# Patient Record
Sex: MALE | Race: WHITE | URBAN - METROPOLITAN AREA
[De-identification: names, ages, dates, MRNs, and addresses within clinical notes are randomized per-mention and may not be internally consistent; named-entity substitution may affect disease eponyms.]

---

## 2022-07-25 ASSESSMENT — EJECTION FRACTION: EF_VALUE: 69

## 2022-07-25 ASSESSMENT — EXERCISE STRESS TEST
PEAK_BP: 122/72
PEAK_HR: 113

## 2022-07-26 ENCOUNTER — HOSPITAL ENCOUNTER (OUTPATIENT)
Dept: CARDIAC REHAB | Age: 42
Setting detail: THERAPIES SERIES
Discharge: HOME | End: 2022-07-26
Payer: COMMERCIAL

## 2022-07-26 VITALS
HEART RATE: 87 BPM | SYSTOLIC BLOOD PRESSURE: 116 MMHG | WEIGHT: 238 LBS | OXYGEN SATURATION: 98 % | RESPIRATION RATE: 18 BRPM | BODY MASS INDEX: 34.07 KG/M2 | DIASTOLIC BLOOD PRESSURE: 68 MMHG | HEIGHT: 70 IN

## 2022-07-26 RX ORDER — PANTOPRAZOLE SODIUM 40 MG/1
40 TABLET, DELAYED RELEASE ORAL 2 TIMES DAILY
COMMUNITY
Start: 2022-06-28

## 2022-07-26 RX ORDER — ACETAMINOPHEN 500 MG
1000 TABLET ORAL EVERY 6 HOURS PRN
COMMUNITY
Start: 2022-06-03

## 2022-07-26 RX ORDER — WARFARIN SODIUM 4 MG/1
4 TABLET ORAL DAILY
COMMUNITY
Start: 2022-07-26

## 2022-07-26 RX ORDER — FLUTICASONE PROPIONATE 50 MCG
2 SPRAY, SUSPENSION (ML) NASAL DAILY PRN
COMMUNITY
Start: 2021-12-21

## 2022-07-26 RX ORDER — PREDNISONE 10 MG/1
20 TABLET ORAL DAILY
COMMUNITY
Start: 2022-07-18

## 2022-07-26 RX ORDER — ASPIRIN 81 MG/1
81 TABLET ORAL DAILY
COMMUNITY
Start: 2022-05-12

## 2022-07-26 RX ORDER — ATORVASTATIN CALCIUM 10 MG/1
10 TABLET, FILM COATED ORAL NIGHTLY
COMMUNITY
Start: 2022-06-30 | End: 2023-06-25

## 2022-07-26 RX ORDER — ESCITALOPRAM OXALATE 5 MG/1
5 TABLET ORAL DAILY
COMMUNITY
Start: 2022-06-01

## 2022-07-26 ASSESSMENT — PATIENT HEALTH QUESTIONNAIRE - PHQ9
SUM OF ALL RESPONSES TO PHQ9 QUESTIONS 1 & 2: 1
8. MOVING OR SPEAKING SO SLOWLY THAT OTHER PEOPLE COULD HAVE NOTICED. OR THE OPPOSITE, BEING SO FIGETY OR RESTLESS THAT YOU HAVE BEEN MOVING AROUND A LOT MORE THAN USUAL: 0
SUM OF ALL RESPONSES TO PHQ QUESTIONS 1-9: 4
SUM OF ALL RESPONSES TO PHQ QUESTIONS 1-9: 4
5. POOR APPETITE OR OVEREATING: 1
6. FEELING BAD ABOUT YOURSELF - OR THAT YOU ARE A FAILURE OR HAVE LET YOURSELF OR YOUR FAMILY DOWN: 0
7. TROUBLE CONCENTRATING ON THINGS, SUCH AS READING THE NEWSPAPER OR WATCHING TELEVISION: 0
1. LITTLE INTEREST OR PLEASURE IN DOING THINGS: 0
SUM OF ALL RESPONSES TO PHQ QUESTIONS 1-9: 4
SUM OF ALL RESPONSES TO PHQ QUESTIONS 1-9: 4
9. THOUGHTS THAT YOU WOULD BE BETTER OFF DEAD, OR OF HURTING YOURSELF: 0
2. FEELING DOWN, DEPRESSED OR HOPELESS: 1
4. FEELING TIRED OR HAVING LITTLE ENERGY: 2
3. TROUBLE FALLING OR STAYING ASLEEP: 0

## 2022-07-26 NOTE — PLAN OF CARE
Individual Treatment Plan  Cardiac Rehabilitation    Initial Assessment  Name: Estefanía Madrigal Admit to Rehab: 2022   : 1980 Primary Diagnosis: CABG    Age: 38 y.o. Referring Physician:  Bina Garza   MRN: [id number] Primary Care Physician: Radha VELASQUEZ   No Known Allergies       Date: 2022    Exercise Assessment  Stages of Change: Action   Risk Stratification: Medium    Fall Risk:   Assistive Devices:None    Initial Assessment:   6 Minute Walk Test   Pre-Test Vitals: Data Measured Before Walk  Heart Rate: 91  Blood Pressure: 108/74  O2 Saturation: 98  O2 Device: Room air;       Peak Vitals: Data Measured Immediately After Walk  Distance Walked (ft): 1150 ft  Peak Heart Rate: 113  Peak Blood Pressure: 122/72  RPE: 13  O2 Saturation: 98  Met: 2.1       During: Data Measured during Walk  Symptoms: Tightness (tightness around lower chest and mid back.)   Number of Rest: 0    Post-Test Vitals: Data Measured at 5 Minutes After Walk  Heart Rate: 84  Blood Pressure: 118/74  O2 Device: Room air  Symptoms: Patient reports chest tightness with walking. Patient has had this pain since he started walking in the hospital.       Antihypertensives:yes - Metoprolol    Exercise Prescription        Mode: . Treadmill, Bike (Upright or recumbent), NuStep, Rower, SciFit, Airdyne, Arm Ergometer, Recumbent Eliptical, and Walking      Frequency: 2-3 days/week supervised      Intensity:       RPE zone 11-14      Target Heart Rate zone: Resting HR + 20-30      Duration: 30+ minutes/day      Resistance Training: Yes, 3 days per week      Progression: Increase exercise by 0.5 METs every 1 to 2 weeks to goal of 4 to 6 METs      Supplemental oxygen: is not on home oxygen therapy. Plan  Home Exercise:Yes Patient employer has a gym he can use.   Mode:Treadmill, Bike (Upright or recumbent), Elliptical, and Walking  Resistance Training: yes     Target Goals  Achieve exercise to 3-4.9 METs   Education   Ian Chase was educated on the importance of self pulse check, warm up and cool down, signs and symptoms to report, exercise safety, RPE scale, Braxton Scale of Perceived Exertion use, Importance of physical activity and exercise progression, equipment orientation, home exercise program, low sodium diet, blood pressure education, and blood pressure medication Patient looking at purchasing a smart watch to track heart rate/oxygen. Nutrition Assessment  Stages of Change: Contemplation    Weight: 238 lb (108 kg) BMI (Calculated): 0     Nutrition Survey: Rate Your Plate Score: Rate Your Plate Total Score: 35     5-10% Weight Loss:    Patient Weight Goal: 230   Recommended Diet: low fat, low cholesterol, substitute healthy fats, such as olive oil, canola oil, grapeseed oil for saturated fats like butter, margarine, and shortening, minimize processed foods, reduce sodium intake, minimize simple sugars, and increase fiber intake  Diabetic:  No  Diabetic Medications: no  A1C:  No results found for: LABA1C 5.2         Fasting Blood Glucose:  [unfilled]  No results found for: CHOL 215 No results found for: HDL 41   Lab Results   Component Value Date    GLUCOSE 107 (H) 06/02/2014    No results found for: TRIG 41   Alcohol Use:ALCHXP@     Nutrition Intervention  Attend education classes related to nutrition and Complete diet survey    Target Goals  Articulate heart healthy diet , Complete cardiac diet classes , Control blood pressure , Control cholesterol values , and Reduce weight   Patient Goal: Lower sodium intake and stop drinking soda and eating desserts. Education  Mr. Tomy Mcpherson was educated on the importance of maintaining optimal weight/BMI.     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  Estimated return to work date: 08/18/2022    Psychosocial Test  Tool Used: PHQ-2/9 Today's PHQ:    PHQ Scores 7/26/2022   PHQ2 Score 1   PHQ9 Score 4     Interpretation of Total Score Depression Severity: 1-4 = Minimal depression, 5-9 = Mild depression, 10-14 = Moderate depression, 15-19 = Moderately severe depression, 20-27 = Severe depression    Reports psychosocial symptoms: yes  Currently on medication therapy: yes  Currently seeing a mental health professional: no  Positive support system: yes    Psychosocial Intervention  Attend education classes related to stress management and relaxation and Learn and utilize stress management and coping skills    Target Goals  Return to ADLs/desired activities , Maximize stress management/coping skills , and Improve quality of live, self-efficacy and depression screening scores as measured by the appropriate tool     Education Assessment  Stages of Change: Action  Barriers to Learning: No barriers  Personal Learning Style: Demonstration, Verbal, Video, and Written  Social History     Tobacco Use   Smoking Status Never   Smokeless Tobacco Former    Types: Snuff    Quit date: 10/4/2021       Education Intervention/Learning Needs Identified  Exercise, Risk factor for CAD, Stress management and relaxation, and Weight management    Target Goals  Articulate understanding of self-management and prevention/treatment of cardiac disease , Attend appropriate group education classes , and Restore to highest level of independent functionality     Education  Mr. Kimberlee Moses was educated on the importance of healthy coping techniques and stress management and relaxation techniques                           Provider Review and Approval of this ITP    The above treatment plan and goals have been set for your patient during Cardiac Rehabilitation. Please review and Electronically Cosign/ or Sign (if Fax Provider)            *Please comment and make changes to the EX RX or treatment plan if needed*                       Electronic Provider comment:              Please indicate with Cosign Comment.                    Electronically signed by Mattie Dan RN on 7/26/22 at 9:59 AM EDT

## 2022-07-26 NOTE — PROGRESS NOTES
Initial ITP signed by medical director. Called pt to schedule 1st day of exercise and he can start 07/27/22 at 10:30 am class.  Instructed pt to be here at 10:15 am.

## 2022-07-27 ENCOUNTER — HOSPITAL ENCOUNTER (OUTPATIENT)
Dept: CARDIAC REHAB | Age: 42
Setting detail: THERAPIES SERIES
Discharge: HOME | End: 2022-07-27
Payer: COMMERCIAL

## 2022-07-27 PROCEDURE — 93798 PHYS/QHP OP CAR RHAB W/ECG: CPT

## 2022-07-29 ENCOUNTER — HOSPITAL ENCOUNTER (OUTPATIENT)
Dept: CARDIAC REHAB | Age: 42
Setting detail: THERAPIES SERIES
Discharge: HOME | End: 2022-07-29
Payer: COMMERCIAL

## 2022-07-29 PROCEDURE — 93798 PHYS/QHP OP CAR RHAB W/ECG: CPT

## 2022-08-01 ENCOUNTER — HOSPITAL ENCOUNTER (OUTPATIENT)
Dept: CARDIAC REHAB | Age: 42
Setting detail: THERAPIES SERIES
Discharge: HOME | End: 2022-08-01
Payer: COMMERCIAL

## 2022-08-01 PROCEDURE — 93798 PHYS/QHP OP CAR RHAB W/ECG: CPT

## 2022-08-05 ENCOUNTER — HOSPITAL ENCOUNTER (OUTPATIENT)
Dept: CARDIAC REHAB | Age: 42
Setting detail: THERAPIES SERIES
Discharge: HOME | End: 2022-08-05
Payer: COMMERCIAL

## 2022-08-05 PROCEDURE — 93798 PHYS/QHP OP CAR RHAB W/ECG: CPT

## 2022-08-12 ENCOUNTER — HOSPITAL ENCOUNTER (OUTPATIENT)
Dept: CARDIAC REHAB | Age: 42
Setting detail: THERAPIES SERIES
Discharge: HOME | End: 2022-08-12
Payer: COMMERCIAL

## 2022-08-12 PROCEDURE — 93798 PHYS/QHP OP CAR RHAB W/ECG: CPT

## 2022-08-15 ENCOUNTER — HOSPITAL ENCOUNTER (OUTPATIENT)
Dept: CARDIAC REHAB | Age: 42
Setting detail: THERAPIES SERIES
Discharge: HOME | End: 2022-08-15
Payer: COMMERCIAL

## 2022-08-15 PROCEDURE — 93798 PHYS/QHP OP CAR RHAB W/ECG: CPT

## 2022-08-17 ENCOUNTER — HOSPITAL ENCOUNTER (OUTPATIENT)
Dept: CARDIAC REHAB | Age: 42
Setting detail: THERAPIES SERIES
Discharge: HOME | End: 2022-08-17
Payer: COMMERCIAL

## 2022-08-17 PROCEDURE — 93798 PHYS/QHP OP CAR RHAB W/ECG: CPT

## 2022-08-19 ENCOUNTER — HOSPITAL ENCOUNTER (OUTPATIENT)
Dept: CARDIAC REHAB | Age: 42
Setting detail: THERAPIES SERIES
Discharge: HOME | End: 2022-08-19
Payer: COMMERCIAL

## 2022-08-19 PROCEDURE — 93798 PHYS/QHP OP CAR RHAB W/ECG: CPT

## 2022-08-22 ENCOUNTER — HOSPITAL ENCOUNTER (OUTPATIENT)
Dept: CARDIAC REHAB | Age: 42
Setting detail: THERAPIES SERIES
Discharge: HOME | End: 2022-08-22
Payer: COMMERCIAL

## 2022-08-22 ENCOUNTER — HOSPITAL ENCOUNTER (OUTPATIENT)
Dept: CARDIAC REHAB | Age: 42
Setting detail: THERAPIES SERIES
End: 2022-08-22
Payer: COMMERCIAL

## 2022-08-22 PROCEDURE — 93798 PHYS/QHP OP CAR RHAB W/ECG: CPT

## 2022-08-24 ENCOUNTER — TELEPHONE (OUTPATIENT)
Dept: CARDIAC REHAB | Age: 42
End: 2022-08-24

## 2022-08-24 ENCOUNTER — HOSPITAL ENCOUNTER (OUTPATIENT)
Dept: CARDIAC REHAB | Age: 42
Setting detail: THERAPIES SERIES
Discharge: HOME | End: 2022-08-24
Payer: COMMERCIAL

## 2022-08-24 ENCOUNTER — APPOINTMENT (OUTPATIENT)
Dept: CARDIAC REHAB | Age: 42
End: 2022-08-24
Payer: COMMERCIAL

## 2022-08-24 ASSESSMENT — EJECTION FRACTION: EF_VALUE: 69

## 2022-08-24 NOTE — TELEPHONE ENCOUNTER
Patient will not be able to make it to his Cardiac Rehab appointment today, 8/24/2022 due to not getting out of work on time.

## 2022-08-25 NOTE — PLAN OF CARE
Individual Treatment Plan  Cardiac Rehabilitation    Reassessment  Name: Edmar Bosch Reassessment: 30 days   : 1980 Primary Diagnosis: CABG    Age: 38 y.o. Referring Physician:  Irina Oliva    MRN: [id number] Primary Care Physician: Rosi VELASQUEZ   No Known Allergies    Date: 2022    Exercise Assessment  Stages of Change: Action   Risk Stratification: Medium  Fall Risk: No flowsheet data found. Assistive Devices: None  Patient in [employer]. Patient tolerates low -moderate levels of exercise well in target RPE zone. Patient at or above Target Heart Rate(THR) zone. Patient asymptomatic. BP with in normal limits. Carter is doing well with exercise. Antihypertensives: yes - Metoprolol    Exercise Prescription        Mode: . Treadmill, Bike (Upright or recumbent), NuStep, Rower, Arm Ergometer, Recumbent Eliptical, and Walking      Frequency: 2-3 days/week supervised      Intensity:RPE 11-14      Current Target Heart Rate: Resting HR + 20-30 Max HR: 119  New Target Heart Rate zone: 105-123  Max Met Level: 3.2  Goal Met Level: >3.5      Duration: 30+ minutes/day      Resistance Training: Yes, 3 days per week      Progression: Increase exercise by 0.5 METs every 1 to 2 weeks to goal of 4 to 6 METs      Supplemental oxygen: is not on home oxygen therapy.     Plan  Home Exercise:Yes  Mode:Walking  Resistance Training: yes     Target Goals  Achieve exercise to 3-4.9 METs , Adhere to 5 days per week exercise program , Adhere to independent aerobic home exercise program, [facility], fitness center , Increase exercise endurance and stamina , and Participate in strength training   Previous goals Achieve exercise to 3-4.9 METs     Education  Mr. Cece Fernandes was educated on the importance of RPE scale, Importance of physical activity and exercise progression, and home exercise program    Nutrition Assessment  Stages of Change: Action            Nutrition Survey: Rate Your Plate Score:       5-21% Weight Loss: 5   Patient Weight Goal: 230 lb   Recommended Diet: substitute healthy fats, such as olive oil, canola oil, grapeseed oil for saturated fats like butter, margarine, and shortening, minimize processed foods, and increase fiber intake  Diabetic:  No  Diabetic Medications: not applicable  D8D:  No results found for: LABA1C  5.2             Fasting Blood Glucose: No results found for: GLU n/an/a  No results found for: CHOL 215 No results found for: HDL 41   No results found for: LDLCALC, LDLCHOLESTEROL, LDLDIRECT  No results found for: TRIG 41     Nutrition Intervention  Attend education classes related to nutrition and Participate in an exercise program that promotes weight loss    Target Goals  Articulate heart healthy diet , Complete cardiac diet classes , and Reduce weight     Previous goals Attend education classes related to nutrition and Complete diet survey    Education  Mr. Kia Mcneal was educated on the importance of nutrition guidelines.     Psychosocial Assessment  Stages of Change: Action  Occupation: Manager Currently working: yes      Psychosocial Test  Tool Used: PHQ-2/9 Today's PHQ:    PHQ Scores 7/26/2022   PHQ2 Score 1   PHQ9 Score 4     Interpretation of Total Score Depression Severity: 1-4 = Minimal depression, 5-9 = Mild depression, 10-14 = Moderate depression, 15-19 = Moderately severe depression, 20-27 = Severe depression    Reports psychosocial symptoms: yes  Currently on medication therapy: yes  Currently seeing a mental health professional: no  Positive support system: yes    Psychosocial Intervention  Attend education classes related to stress management and relaxation, Learn and utilize stress management and coping skills, and Participate in an exercise program that improves psychosocial symptoms    Target Goals  Return to ADLs/desired activities , Maximize stress management/coping skills , and Improve quality of live, self-efficacy and depression screening scores as measured by the appropriate tool Previous goals Return to ADLs/desired activities , Maximize stress management/coping skills , and Improve quality of live, self-efficacy and depression screening scores as measured by the appropriate tool        Education Assessment  Stages of Change: Action  Barriers to Learning: No barriers  Personal Learning Style: Verbal, Video, and Written  Social History     Tobacco Use   Smoking Status Never   Smokeless Tobacco Former    Types: Snuff    Quit date: 10/4/2021       Education Intervention/Learning Needs Identified  Exercise, Nutrition, and Weight management    Target Goals  Articulate understanding of self-management and prevention/treatment of cardiac disease , Attend appropriate web education classes , and Restore to highest level of independent functionality     Previous goals Articulate understanding of self-management and prevention/treatment of cardiac disease , Attend appropriate group education classes , and Restore to highest level of independent functionality   Education  Mr. Obed Thibodeaux was educated on the importance of healthy coping techniques and stress management and relaxation techniques                           Provider Review and Approval of this ITP    The above treatment plan and goals have been set for your patient during Cardiac Rehabilitation. Please review and Electronically Cosign/ or Sign (if Fax Provider)            *Please comment and make changes to the EX RX or treatment plan if needed*                       Electronic Provider comment:              Please indicate with Cosign Comment.                    Electronically signed by Mercy Harrison RN on 8/25/22 at 4:04 PM EDT

## 2022-08-26 ENCOUNTER — APPOINTMENT (OUTPATIENT)
Dept: CARDIAC REHAB | Age: 42
End: 2022-08-26
Payer: COMMERCIAL

## 2022-08-26 ENCOUNTER — HOSPITAL ENCOUNTER (OUTPATIENT)
Dept: CARDIAC REHAB | Age: 42
Setting detail: THERAPIES SERIES
Discharge: HOME | End: 2022-08-26
Payer: COMMERCIAL

## 2022-08-26 PROCEDURE — 93798 PHYS/QHP OP CAR RHAB W/ECG: CPT

## 2022-08-29 ENCOUNTER — HOSPITAL ENCOUNTER (OUTPATIENT)
Dept: CARDIAC REHAB | Age: 42
Setting detail: THERAPIES SERIES
Discharge: HOME | End: 2022-08-29
Payer: COMMERCIAL

## 2022-08-29 ENCOUNTER — APPOINTMENT (OUTPATIENT)
Dept: CARDIAC REHAB | Age: 42
End: 2022-08-29
Payer: COMMERCIAL

## 2022-08-29 PROCEDURE — 93798 PHYS/QHP OP CAR RHAB W/ECG: CPT

## 2022-08-31 ENCOUNTER — APPOINTMENT (OUTPATIENT)
Dept: CARDIAC REHAB | Age: 42
End: 2022-08-31
Payer: COMMERCIAL

## 2022-08-31 ENCOUNTER — HOSPITAL ENCOUNTER (OUTPATIENT)
Dept: CARDIAC REHAB | Age: 42
Setting detail: THERAPIES SERIES
Discharge: HOME | End: 2022-08-31
Payer: COMMERCIAL

## 2022-08-31 PROCEDURE — 93798 PHYS/QHP OP CAR RHAB W/ECG: CPT

## 2022-09-02 ENCOUNTER — APPOINTMENT (OUTPATIENT)
Dept: CARDIAC REHAB | Age: 42
End: 2022-09-02
Payer: COMMERCIAL

## 2022-09-05 ENCOUNTER — APPOINTMENT (OUTPATIENT)
Dept: CARDIAC REHAB | Age: 42
End: 2022-09-05
Payer: COMMERCIAL

## 2022-09-07 ENCOUNTER — APPOINTMENT (OUTPATIENT)
Dept: CARDIAC REHAB | Age: 42
End: 2022-09-07
Payer: COMMERCIAL

## 2022-09-09 ENCOUNTER — APPOINTMENT (OUTPATIENT)
Dept: CARDIAC REHAB | Age: 42
End: 2022-09-09
Payer: COMMERCIAL

## 2022-09-09 ENCOUNTER — HOSPITAL ENCOUNTER (OUTPATIENT)
Dept: CARDIAC REHAB | Age: 42
Setting detail: THERAPIES SERIES
End: 2022-09-09
Payer: COMMERCIAL

## 2022-09-12 ENCOUNTER — APPOINTMENT (OUTPATIENT)
Dept: CARDIAC REHAB | Age: 42
End: 2022-09-12
Payer: COMMERCIAL

## 2022-09-14 ENCOUNTER — APPOINTMENT (OUTPATIENT)
Dept: CARDIAC REHAB | Age: 42
End: 2022-09-14
Payer: COMMERCIAL

## 2022-09-15 ENCOUNTER — TELEPHONE (OUTPATIENT)
Dept: CARDIAC REHAB | Age: 42
End: 2022-09-15

## 2022-09-15 NOTE — TELEPHONE ENCOUNTER
Called patient and he caught covid after coming home from vacation. Patient thinks he will be ready to come back on 09/19/22.

## 2022-09-16 ENCOUNTER — APPOINTMENT (OUTPATIENT)
Dept: CARDIAC REHAB | Age: 42
End: 2022-09-16
Payer: COMMERCIAL

## 2022-09-19 ENCOUNTER — APPOINTMENT (OUTPATIENT)
Dept: CARDIAC REHAB | Age: 42
End: 2022-09-19
Payer: COMMERCIAL

## 2022-09-19 ENCOUNTER — HOSPITAL ENCOUNTER (OUTPATIENT)
Dept: CARDIAC REHAB | Age: 42
Setting detail: THERAPIES SERIES
Discharge: HOME | End: 2022-09-19
Payer: COMMERCIAL

## 2022-09-19 PROCEDURE — 93798 PHYS/QHP OP CAR RHAB W/ECG: CPT

## 2022-09-21 ENCOUNTER — APPOINTMENT (OUTPATIENT)
Dept: CARDIAC REHAB | Age: 42
End: 2022-09-21
Payer: COMMERCIAL

## 2022-09-23 ENCOUNTER — HOSPITAL ENCOUNTER (OUTPATIENT)
Dept: CARDIAC REHAB | Age: 42
Setting detail: THERAPIES SERIES
Discharge: HOME | End: 2022-09-23
Payer: COMMERCIAL

## 2022-09-23 ENCOUNTER — APPOINTMENT (OUTPATIENT)
Dept: CARDIAC REHAB | Age: 42
End: 2022-09-23
Payer: COMMERCIAL

## 2022-09-23 NOTE — PLAN OF CARE
Individual Treatment Plan  Cardiac Rehabilitation    Reassessment  Name: Sandra Toledo Reassessment: 60 days   : 1980 Primary Diagnosis: CABG    Age: 38 y.o. Referring Physician:  Dr. Naga Jimenes MD   MRN: [id number] Primary Care Physician: Romel VELASQUEZ   No Known Allergies    Date: 2022    Exercise Assessment  Stages of Change: Action   Risk Stratification: Medium  Fall Risk: No flowsheet data found. Assistive Devices: None  Sessions completed:     Antihypertensives: yes - Metoprolol    Exercise Prescription        Mode: . Treadmill, Bike (Upright or recumbent), NuStep, Rower, Arm Ergometer, Recumbent Eliptical, and Walking      Frequency: 2-3 days/week supervised      Intensity:RPE 11-14      Current Target Heart Rate:105-123  New Target Heart Rate zone:105-140      Duration: 32+ minutes/day      Resistance Training: Yes, 3 days per week      Progression: Increase exercise by 0.5 METs every 1 to 2 weeks to goal of 4 to 6 METs      Supplemental oxygen: is not on home oxygen therapy.     Plan  Home Exercise:Yes  Mode:Walking  Resistance Training: no     Target Goals  Achieve exercise to 3-4.9 METs , Adhere to independent aerobic home exercise program, [facility], fitness center , and Increase exercise endurance and stamina   Previous goals Achieve exercise to 3-4.9 METs , Adhere to 5 days per week exercise program , Adhere to independent aerobic home exercise program, [facility], fitness center , Increase exercise endurance and stamina , and Participate in strength training   Previous goals Achieve exercise to 3-4.9 METs     Education   Lowell Gunn was educated on the importance of exercise safety, Importance of physical activity and exercise progression, low sodium diet, blood pressure education, and blood pressure medication    Nutrition Assessment  Stages of Change: Preparation            Nutrition Survey: Rate Your Plate Score:       1-12% Weight Loss: 5%   Patient Weight Goal: 230 lb  Current weight:239 lb   Recommended Diet: minimize processed foods, minimize simple sugars, and increase fiber intake  Diabetic:  No  Diabetic Medications: no  A1C:  No results found for: LABA1C   5.2               Fasting Blood Glucose: No results found for: GLU   No results found for: CHOL 215 No results found for: HDL 41   No results found for: LDLCALC, LDLCHOLESTEROL, LDLDIRECT 138 No results found for: TRIG 181     Nutrition Intervention  Attend education classes related to nutrition and Participate in an exercise program that promotes weight loss    Target Goals  Complete cardiac diet classes , Control blood pressure , Control cholesterol values , Control triglyceride values , and Reduce weight     Previous goals Articulate heart healthy diet , Complete cardiac diet classes , and Reduce weight     Education  Mr. Lowell Gunn was educated on the importance of maintaining optimal weight/BMI and nutrition guidelines.     Psychosocial Assessment  Stages of Change: Action  Currently working: yes      Psychosocial Test  Tool Used: PHQ-2/9 Today's PHQ:    PHQ Scores 7/26/2022   PHQ2 Score 1   PHQ9 Score 4     Interpretation of Total Score Depression Severity: 1-4 = Minimal depression, 5-9 = Mild depression, 10-14 = Moderate depression, 15-19 = Moderately severe depression, 20-27 = Severe depression    Reports psychosocial symptoms: yes  Currently on medication therapy: yes  Currently seeing a mental health professional: no  Positive support system: yes    Psychosocial Intervention  Attend education classes related to stress management and relaxation and Participate in an exercise program that improves psychosocial symptoms    Target Goals  Maximize stress management/coping skills   Previous goals Return to ADLs/desired activities , Maximize stress management/coping skills , and Improve quality of live, self-efficacy and depression screening scores as measured by the appropriate tool   Previous goals Return to ADLs/desired activities , Maximize stress management/coping skills , and Improve quality of live, self-efficacy and depression screening scores as measured by the appropriate tool     Education Assessment  Stages of Change: Action  Barriers to Learning: No barriers  Personal Learning Style: Demonstration, Verbal, and Video  Social History     Tobacco Use   Smoking Status Never   Smokeless Tobacco Former    Types: Snuff    Quit date: 10/4/2021       Education Intervention/Learning Needs Identified  Exercise, Nutrition, and Weight management    Target Goals  Articulate understanding of self-management and prevention/treatment of cardiac disease  and Attend appropriate web education classes     Previous goals Articulate understanding of self-management and prevention/treatment of cardiac disease , Attend appropriate group education classes , and Restore to highest level of independent functionality   Education  Mr. Erica Agosto was educated on the importance of relaxation techniques                           Provider Review and Approval of this ITP    The above treatment plan and goals have been set for your patient during Cardiac Rehabilitation. Please review and Electronically Cosign/ or Sign (if Fax Provider)            *Please comment and make changes to the EX RX or treatment plan if needed*                       Electronic Provider comment:              Please indicate with Cosign Comment.                      Electronically signed by Campos Echeverria RN on 9/23/22 at 8:15 AM EDT

## 2022-09-26 ENCOUNTER — APPOINTMENT (OUTPATIENT)
Dept: CARDIAC REHAB | Age: 42
End: 2022-09-26
Payer: COMMERCIAL

## 2022-09-26 ENCOUNTER — HOSPITAL ENCOUNTER (OUTPATIENT)
Dept: CARDIAC REHAB | Age: 42
Setting detail: THERAPIES SERIES
Discharge: HOME | End: 2022-09-26
Payer: COMMERCIAL

## 2022-09-26 PROCEDURE — 93798 PHYS/QHP OP CAR RHAB W/ECG: CPT

## 2022-09-28 ENCOUNTER — APPOINTMENT (OUTPATIENT)
Dept: CARDIAC REHAB | Age: 42
End: 2022-09-28
Payer: COMMERCIAL

## 2022-09-30 ENCOUNTER — HOSPITAL ENCOUNTER (OUTPATIENT)
Dept: CARDIAC REHAB | Age: 42
Setting detail: THERAPIES SERIES
Discharge: HOME | End: 2022-09-30
Payer: COMMERCIAL

## 2022-09-30 ENCOUNTER — APPOINTMENT (OUTPATIENT)
Dept: CARDIAC REHAB | Age: 42
End: 2022-09-30
Payer: COMMERCIAL

## 2022-09-30 PROCEDURE — 93798 PHYS/QHP OP CAR RHAB W/ECG: CPT

## 2022-10-03 ENCOUNTER — APPOINTMENT (OUTPATIENT)
Dept: CARDIAC REHAB | Age: 42
End: 2022-10-03
Payer: COMMERCIAL

## 2022-10-05 ENCOUNTER — APPOINTMENT (OUTPATIENT)
Dept: CARDIAC REHAB | Age: 42
End: 2022-10-05
Payer: COMMERCIAL

## 2022-10-07 ENCOUNTER — APPOINTMENT (OUTPATIENT)
Dept: CARDIAC REHAB | Age: 42
End: 2022-10-07
Payer: COMMERCIAL

## 2022-10-10 ENCOUNTER — APPOINTMENT (OUTPATIENT)
Dept: CARDIAC REHAB | Age: 42
End: 2022-10-10
Payer: COMMERCIAL

## 2022-10-10 ENCOUNTER — TELEPHONE (OUTPATIENT)
Dept: CARDIAC REHAB | Age: 42
End: 2022-10-10

## 2022-10-10 ENCOUNTER — HOSPITAL ENCOUNTER (OUTPATIENT)
Dept: CARDIAC REHAB | Age: 42
Setting detail: THERAPIES SERIES
End: 2022-10-10
Payer: COMMERCIAL

## 2022-10-10 NOTE — TELEPHONE ENCOUNTER
Patient unable to come to rehab today due to work. Patient was not here at all last week due to his \"leg being screwed up\" and could not drive. Patient reports work is busy and he is not sure he can do 36 visits of rehab and next time he is in, he needs to come up with a plan. Discharge process reviewed with patient and encouraged patient to continue with rehab.

## 2022-10-12 ENCOUNTER — APPOINTMENT (OUTPATIENT)
Dept: CARDIAC REHAB | Age: 42
End: 2022-10-12
Payer: COMMERCIAL

## 2022-10-12 ENCOUNTER — HOSPITAL ENCOUNTER (OUTPATIENT)
Dept: CARDIAC REHAB | Age: 42
Setting detail: THERAPIES SERIES
End: 2022-10-12
Payer: COMMERCIAL

## 2022-10-14 ENCOUNTER — APPOINTMENT (OUTPATIENT)
Dept: CARDIAC REHAB | Age: 42
End: 2022-10-14
Payer: COMMERCIAL

## 2022-10-14 ENCOUNTER — HOSPITAL ENCOUNTER (OUTPATIENT)
Dept: CARDIAC REHAB | Age: 42
Setting detail: THERAPIES SERIES
Discharge: HOME | End: 2022-10-14
Payer: COMMERCIAL

## 2022-10-14 PROCEDURE — 93798 PHYS/QHP OP CAR RHAB W/ECG: CPT

## 2022-10-17 ENCOUNTER — APPOINTMENT (OUTPATIENT)
Dept: CARDIAC REHAB | Age: 42
End: 2022-10-17
Payer: COMMERCIAL

## 2022-10-19 ENCOUNTER — APPOINTMENT (OUTPATIENT)
Dept: CARDIAC REHAB | Age: 42
End: 2022-10-19
Payer: COMMERCIAL

## 2022-10-19 ENCOUNTER — HOSPITAL ENCOUNTER (OUTPATIENT)
Dept: CARDIAC REHAB | Age: 42
Setting detail: THERAPIES SERIES
Discharge: HOME | End: 2022-10-19
Payer: COMMERCIAL

## 2022-10-19 NOTE — PLAN OF CARE
Individual Treatment Plan  Cardiac Rehabilitation    Reassessment  Name: Radha Cristobal Reassessment: 90 days   : 1980 Primary Diagnosis: CABG    Age: 38 y.o. Referring Physician:  Dr. Rosie Page   MRN: [id number] Primary Care Physician: Paulina VELASQUEZ   No Known Allergies    Date: 10/19/2022    Patient has attended 1 session in October. Patient barrier is work. Exercise Assessment  Stages of Change: Action   Risk Stratification: Medium  Fall Risk: No flowsheet data found. Assistive Devices: None  Sessions completed:     Antihypertensives: yes - Metoprolol    Exercise Prescription        Mode: . Treadmill, Bike (Upright or recumbent), NuStep, SciFit, Arm Ergometer, Recumbent Eliptical, and Walking      Frequency: 2-3 days/week supervised      Intensity:RPE 11-14      Current Target Heart Rate:105-140  New Target Heart Rate zone:no change      Duration: 30+ minutes/day      Resistance Training: Yes, 3 days per week      Progression: Increase exercise by 0.5 METs every 1 to 2 weeks to goal of 4 to 6 METs      Supplemental oxygen: is not on home oxygen therapy.     Plan  Home Exercise:Yes  Mode:Walking  Resistance Training: no     Target Goals  Adhere to independent aerobic home exercise program, [facility], fitness center   Previous goals Achieve exercise to 3-4.9 METs , Adhere to independent aerobic home exercise program, [facility], fitness center , and Increase exercise endurance and stamina     Education  Mr. Olga Jordan was educated on the importance of Importance of physical activity and exercise progression and home exercise program    Nutrition Assessment  Stages of Change: Action            Nutrition Survey: Rate Your Plate Score:       7-35% Weight Loss: 5%   Patient Weight Goal: 230 lb   Recommended Diet: minimize processed foods, reduce sodium intake, minimize simple sugars, and increase fiber intake  Diabetic:  No  Diabetic Medications: no  A1C:  No results found for: LABA1C      5.2         Fasting Blood Glucose: No results found for: GLU   No results found for: CHOL 215 No results found for: HDL 41    No results found for: LDLCALC, LDLCHOLESTEROL, LDLDIRECT 138 No results found for: TRIG 181     Nutrition Intervention  Attend education classes related to nutrition and Participate in an exercise program that promotes weight loss    Target Goals  Complete cardiac diet classes  and Reduce weight     Previous goals Complete cardiac diet classes , Control blood pressure , Control cholesterol values , Control triglyceride values , and Reduce weight     Education  Mr. Maribel Stoner was educated on the importance of maintaining optimal weight/BMI and nutrition guidelines.     Psychosocial Assessment  Stages of Change: Action Currently working: yes      Psychosocial Test  Tool Used: PHQ-2/9 Today's PHQ:    PHQ Scores 7/26/2022   PHQ2 Score 1   PHQ9 Score 4     Interpretation of Total Score Depression Severity: 1-4 = Minimal depression, 5-9 = Mild depression, 10-14 = Moderate depression, 15-19 = Moderately severe depression, 20-27 = Severe depression    Reports psychosocial symptoms: yes  Currently on medication therapy: yes  Currently seeing a mental health professional: no  Positive support system: yes    Psychosocial Intervention  Attend education classes related to stress management and relaxation and Participate in an exercise program that improves psychosocial symptoms    Target Goals  Maximize stress management/coping skills   Previous goals Maximize stress management/coping skills     Education Assessment  Stages of Change: Action  Barriers to Learning: No barriers  Personal Learning Style: Demonstration, Verbal, and Video  Social History     Tobacco Use   Smoking Status Never   Smokeless Tobacco Former    Types: Snuff    Quit date: 10/4/2021       Education Intervention/Learning Needs Identified  Exercise, Nutrition, and Weight management    Target Goals  Attend appropriate web education classes     Previous goals Articulate understanding of self-management and prevention/treatment of cardiac disease  and Attend appropriate web education classes   Education  Mr. Iftikhar Sheehan was educated on the importance of relaxation techniques                           Provider Review and Approval of this ITP    The above treatment plan and goals have been set for your patient during Cardiac Rehabilitation. Please review and Electronically Cosign/ or Sign (if Fax Provider)            *Please comment and make changes to the EX RX or treatment plan if needed*                       Electronic Provider comment:              Please indicate with Cosign Comment.                    Electronically signed by Tara Hamilton RN on 10/19/22 at 3:16 PM EDT

## 2022-10-21 ENCOUNTER — HOSPITAL ENCOUNTER (OUTPATIENT)
Dept: CARDIAC REHAB | Age: 42
Setting detail: THERAPIES SERIES
Discharge: HOME | End: 2022-10-21
Payer: COMMERCIAL

## 2022-10-21 ENCOUNTER — APPOINTMENT (OUTPATIENT)
Dept: CARDIAC REHAB | Age: 42
End: 2022-10-21
Payer: COMMERCIAL

## 2022-10-24 ENCOUNTER — APPOINTMENT (OUTPATIENT)
Dept: CARDIAC REHAB | Age: 42
End: 2022-10-24
Payer: COMMERCIAL

## 2022-10-26 ENCOUNTER — APPOINTMENT (OUTPATIENT)
Dept: CARDIAC REHAB | Age: 42
End: 2022-10-26
Payer: COMMERCIAL

## 2022-10-28 ENCOUNTER — TELEPHONE (OUTPATIENT)
Dept: CARDIAC REHAB | Age: 42
End: 2022-10-28

## 2022-10-28 ENCOUNTER — HOSPITAL ENCOUNTER (OUTPATIENT)
Dept: CARDIAC REHAB | Age: 42
Setting detail: THERAPIES SERIES
Discharge: HOME | End: 2022-10-28
Payer: COMMERCIAL

## 2022-10-28 ENCOUNTER — APPOINTMENT (OUTPATIENT)
Dept: CARDIAC REHAB | Age: 42
End: 2022-10-28
Payer: COMMERCIAL

## 2022-10-28 PROCEDURE — 93798 PHYS/QHP OP CAR RHAB W/ECG: CPT

## 2022-10-28 ASSESSMENT — PATIENT HEALTH QUESTIONNAIRE - PHQ9
9. THOUGHTS THAT YOU WOULD BE BETTER OFF DEAD, OR OF HURTING YOURSELF: 0
SUM OF ALL RESPONSES TO PHQ9 QUESTIONS 1 & 2: 0
10. IF YOU CHECKED OFF ANY PROBLEMS, HOW DIFFICULT HAVE THESE PROBLEMS MADE IT FOR YOU TO DO YOUR WORK, TAKE CARE OF THINGS AT HOME, OR GET ALONG WITH OTHER PEOPLE: 0
8. MOVING OR SPEAKING SO SLOWLY THAT OTHER PEOPLE COULD HAVE NOTICED. OR THE OPPOSITE, BEING SO FIGETY OR RESTLESS THAT YOU HAVE BEEN MOVING AROUND A LOT MORE THAN USUAL: 0
6. FEELING BAD ABOUT YOURSELF - OR THAT YOU ARE A FAILURE OR HAVE LET YOURSELF OR YOUR FAMILY DOWN: 0
SUM OF ALL RESPONSES TO PHQ QUESTIONS 1-9: 1
3. TROUBLE FALLING OR STAYING ASLEEP: 0
5. POOR APPETITE OR OVEREATING: 0
SUM OF ALL RESPONSES TO PHQ QUESTIONS 1-9: 1
2. FEELING DOWN, DEPRESSED OR HOPELESS: 0
SUM OF ALL RESPONSES TO PHQ QUESTIONS 1-9: 1
SUM OF ALL RESPONSES TO PHQ QUESTIONS 1-9: 1
4. FEELING TIRED OR HAVING LITTLE ENERGY: 1
1. LITTLE INTEREST OR PLEASURE IN DOING THINGS: 0
7. TROUBLE CONCENTRATING ON THINGS, SUCH AS READING THE NEWSPAPER OR WATCHING TELEVISION: 0

## 2022-10-28 ASSESSMENT — EXERCISE STRESS TEST
PEAK_BP: 140/72
PEAK_HR: 140

## 2022-10-28 NOTE — TELEPHONE ENCOUNTER
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 Patient is unable to come to rehab due to work. Patient plans to come in today and do discharge.

## 2022-10-28 NOTE — PLAN OF CARE
Individual Treatment Plan  Cardiac Rehabilitation    Discharge   Name: Karena De Los Santos to Rehab: 2022   : 1980 Primary Diagnosis: CABG    Age: 38 y.o.  Referring Physician:  Raven Enriquez   MRN: [id number] Primary Care Physician: Reuben VELASQUEZ   No Known Allergies    Exercise Assessment  Stages of Change: Action   Risk Stratification: Medium    Fall Risk: Medium  Assistive Devices:None    Initial Assessment 6 Minute Walk Test feet:     Post 6 minute walk test:   Pre-Test Vitals: Data Measured Before Walk  Heart Rate: 108  Blood Pressure: 118/70  O2 Saturation: 98  O2 Device: Room air;       Peak Vitals: Data Measured Immediately After Walk  Distance Walked (ft): 1725 ft  Peak Heart Rate: 140  Peak Blood Pressure: 140/72  RPE: 13/20  O2 Saturation: 96  Met: 3.3       During: Data Measured during Walk  Describe Type of Pain You Are Having: chronic leg pain   Number of Rest: 0    Post-Test Vitals: Data Measured at 5 Minutes After Walk  O2 Device: Room air    Post 6 Minute Walk Test feet:     Key Anti-Hypertensive Meds            metoprolol tartrate (LOPRESSOR) 25 MG tablet    Class: Historical Med             Exercise Prescription        Mode: .Walking      Frequency: 2-3 days/week supervised      Intensity:RPE 11-14      Current Target Heart Rate: 105-140      Duration: 30+ minutes/day      Resistance Training: Yes, 3 days per week      Progression: no change      Supplemental oxygen:No  Max Met Level:     Plan  Home Exercise: Yes  Mode:Walking  Resistance Training:No    Target Goals  Adhere to cardiac exercise guidelines  and Adhere to independent aerobic home exercise program, [facility], fitness center   Education  Mr. Irma Blanc was educated on the importance of   home exercise program    Nutrition Assessment  Stages of Change: Pre-Contemplation            Nutrition Survey: Rate Your Plate Score: Rate Your Plate Total Score: 42     Patient Weight Goal: 230 lb   Recommended Diet:minimize processed foods, reduce sodium intake, minimize simple sugars, and increase fiber intake  Diabetic:No  Key Antihyperglycemic Medications       Patient is on no antihyperglycemic meds. Key Hyperlipidemia Meds            atorvastatin (LIPITOR) 10 MG tablet    Class: Historical Med                 Lab Results   Component Value Date    GLUCOSE 107 (H) 06/02/2014          Nutrition Intervention  Complete diet survey    Target Goals  Reduce weight     Education  Mr. Francine Enamorado was educated on the importance ofnutrition guidelines    Psychosocial Assessment  Stages of Change:Action  Social History     Socioeconomic History    Marital status:      Spouse name: Not on file    Number of children: Not on file    Years of education: Not on file    Highest education level: Not on file   Occupational History     Employer: UNKNOWN    Occupation:      Comment: [employer]   Tobacco Use    Smoking status: Never    Smokeless tobacco: Former     Types: Snuff     Quit date: 10/4/2021   Vaping Use    Vaping Use: Never used   Substance and Sexual Activity    Alcohol use: Yes     Alcohol/week: 2.0 standard drinks     Types: 2 Cans of beer per week    Drug use: No    Sexual activity: Not on file   Other Topics Concern    Not on file   Social History Narrative    Not on file     Social Determinants of Health     Financial Resource Strain: Not on file   Food Insecurity: Not on file   Transportation Needs: Not on file   Physical Activity: Not on file   Stress: Not on file   Social Connections: Not on file   Intimate Partner Violence: Not on file   Housing Stability: Not on file      Estimated return to work date: currently working full time. Psychosocial Test  Tool Used:  Today's PHQ:    PHQ Scores 10/28/2022 7/26/2022   PHQ2 Score 0 1   PHQ9 Score 1 4     Interpretation of Total Score Depression Severity: 1-4 = Minimal depression, 5-9 = Mild depression, 10-14 = Moderate depression, 15-19 = Moderately severe depression, 20-27 = Severe depression    Reports psychosocial symptoms:No  Currently on medication therapy:Yes  Currently seeing a mental health professional:No  Positive support system:Yes    Psychosocial Intervention  Learn and utilize stress management and coping skills    Target Goals  Return to vocation     Education Assessment  Stages of Change: Action  Barriers to Learning: No barriers  Personal Learning Style:Verbal and Video  Social History     Tobacco Use   Smoking Status Never   Smokeless Tobacco Former    Types: Snuff    Quit date: 10/4/2021       Education Intervention/Learning Needs Identified  Exercise, Nutrition, Risk factor for CAD, and Weight management    Target Goals  Attend appropriate web education classes     Education  Mr. Esteban Diaz was educated on the importance of relaxation techniques                             Provider Review and Approval of this ITP    The above treatment plan and goals have been set for your patient during Cardiac Rehabilitation. Please review and Electronically Cosign/ or Sign (if Fax Provider)            *Please comment and make changes to the EX RX or treatment plan if needed*                       Electronic Provider comment:              Please indicate with Cosign Comment.                    Electronically signed by Mathieu Campbell RN on 10/28/22 at [address] [place] [address] EDT

## 2022-10-31 ENCOUNTER — APPOINTMENT (OUTPATIENT)
Dept: CARDIAC REHAB | Age: 42
End: 2022-10-31
Payer: COMMERCIAL